# Patient Record
Sex: MALE | Race: WHITE | NOT HISPANIC OR LATINO | Employment: FULL TIME | URBAN - METROPOLITAN AREA
[De-identification: names, ages, dates, MRNs, and addresses within clinical notes are randomized per-mention and may not be internally consistent; named-entity substitution may affect disease eponyms.]

---

## 2018-04-26 PROCEDURE — 88304 TISSUE EXAM BY PATHOLOGIST: CPT | Performed by: PATHOLOGY

## 2018-04-26 PROCEDURE — 88342 IMHCHEM/IMCYTCHM 1ST ANTB: CPT | Performed by: PATHOLOGY

## 2018-04-27 ENCOUNTER — LAB REQUISITION (OUTPATIENT)
Dept: LAB | Facility: HOSPITAL | Age: 53
End: 2018-04-27
Payer: COMMERCIAL

## 2018-04-27 DIAGNOSIS — K14.8 OTHER DISEASES OF TONGUE: ICD-10-CM

## 2022-07-06 ENCOUNTER — TELEPHONE (OUTPATIENT)
Dept: UROLOGY | Facility: MEDICAL CENTER | Age: 57
End: 2022-07-06

## 2022-07-06 NOTE — TELEPHONE ENCOUNTER
Spoke with patient's wife  Scheduled appointment with Dr Katja Velazco  Requested that all records be faxed as well as the disc of the MRI of the prostate from 4/8/21 from [facility]  She is going to have everything sent over before the appt

## 2022-07-06 NOTE — TELEPHONE ENCOUNTER
Please Triage  New Patient    What is the reason for the patients appointment? Pt's wife called the office stated that Soniya Herrera was diagnosed with prostate cancer last year and he would like to follow up with a urologies  What office location does the patient prefer? [place]/ [place]     Imaging/Lab Results: care everywhere     Do we accept the patient's insurance or is the patient Self-Pay? Insurance Provider: [employer]  Plan Type/Number:  Member ID#: Has the patient had any previous Urologist(s)? [facility] and [facility]   Pt will call them to fax us all the records     Have patient records been requested? If not are records showing in Epic: care everywhere    Has the patient had any outside testing done?  Care everywhere    Does the patient have a personal history of cancer?   prostate     Pt can be reached at [phone]

## 2022-07-14 ENCOUNTER — TELEPHONE (OUTPATIENT)
Dept: UROLOGY | Facility: MEDICAL CENTER | Age: 57
End: 2022-07-14

## 2022-07-19 ENCOUNTER — OFFICE VISIT (OUTPATIENT)
Dept: UROLOGY | Facility: AMBULATORY SURGERY CENTER | Age: 57
End: 2022-07-19
Payer: COMMERCIAL

## 2022-07-19 VITALS
OXYGEN SATURATION: 98 % | DIASTOLIC BLOOD PRESSURE: 80 MMHG | WEIGHT: 193 LBS | SYSTOLIC BLOOD PRESSURE: 122 MMHG | HEART RATE: 54 BPM

## 2022-07-19 DIAGNOSIS — C61 PROSTATE CANCER (HCC): Primary | ICD-10-CM

## 2022-07-19 PROCEDURE — 99205 OFFICE O/P NEW HI 60 MIN: CPT | Performed by: UROLOGY

## 2022-07-19 RX ORDER — ASPIRIN 81 MG/1
81 TABLET, CHEWABLE ORAL DAILY
COMMUNITY

## 2022-07-19 NOTE — PROGRESS NOTES
7/19/2022    William Colin  1965  [id number]        Assessment  [place] 6 prostate cancer    Plan  We had an extended prostate cancer discussion today  Although he was diagnosed with Emilie 6 prostate cancer over 1 year ago, he never really had a full explanation of the implications of this finding and options  We discussed the grading and staging of prostate cancer in detail  I explained both the Emilie grading scale and a new work grade grouping scale  Explained that he is on the lowest level of both scales  As he only has 1 core positive, he is categorized as very low risk disease  Options were reviewed  Standard of care is active surveillance  He had many questions about possible surgeries as well as potential radiation therapy  He reported that his father underwent radical prostatectomy many years ago and has incontinence  His father long underwent what sounds like TURP and he asked that this was an option as well  We discussed that really recommendation for him is active surveillance  We discussed periodic PSA, follow-up MRI at 1 year, and typically follow-up biopsy as well  He has been over 1 year and has not yet had a PSA, MRI or biopsy  At this point, my recommendation is follow-up PSA as well as MRI  If there is any cause for concern, would then proceed with biopsy as appropriate either MRI fusion or random  However if PSA remains stable and MRI revealed no risk, he may wish to simply continue to monitor without confirmatory biopsy by utilizing both PSA and MRI in the future  They are happy with this plan  He will also watch for any urinary symptoms  If he notices increased symptoms he understands cystoscopy will be recommended, especially in light of his history of stricture  History of Present Illness  Dipti Ward is a 55 y o  male managed by [facility]  He is found to have elevated PSA of 7 in April 2021    An MRI was performed revealing PI-RADS 1 category, 43 mL prostate volume  He underwent random prostate biopsy which revealed 1/12 cores positive for Emilie 6 prostate cancer  He was diagnosed with very low risk, low-grade prostate cancer  He began surveillance  He was recommended to follow up for another biopsy 6 months later but he chose not to do so  He was then seen just recently and biopsy was recommended  He is now seeking second opinion  He is concerned with potential treatment options for prostate cancer  He reports he never had a prostate cancer discussion after his initial biopsy  He was called by a nurse who revealed results on the phone while he was at work and arranged his follow-up  He is here with his wife, both from have questions regarding his diagnosis, staging, and appropriate treatment course  He does have family history of prostate cancer in his father who underwent radical prostatectomy many years ago  He is not sure if it was robotic  He does wear pads daily due to incontinence  Patient also gives history of urethral stricture  He was seen by numerous urologists in the past for this  Eventually he was referred to [facility] in [place] and underwent formal open urethroplasty  Review of Systems  Review of Systems   Constitutional: Negative  HENT: Negative  Respiratory: Negative  Cardiovascular: Negative  Gastrointestinal: Negative  Genitourinary:        As per HPI   Musculoskeletal: Negative  Skin: Negative  Neurological: Negative  Hematological: Negative            Past Medical History  Past Medical History:   Diagnosis Date    Inguinal hernia 06/22/2022    left side    Inguinal hernia 2008    right side    Kidney stone 2014       Past Social History  Past Surgical History:   Procedure Laterality Date    HERNIA REPAIR Left 06/2022    HERNIA REPAIR Right 2008    URETHRAL DILATION N/A 1990       Past Family History  Family History   Problem Relation Age of Onset    Heart Valve Disease Father [de-identified]    Prostate cancer Father 76    Hyperlipidemia Mother     Heart attack Paternal Grandfather        Past Social history  Social History     Socioeconomic History    Marital status: /Civil Union     Spouse name: Not on file    Number of children: Not on file    Years of education: Not on file    Highest education level: Not on file   Occupational History    Not on file   Tobacco Use    Smoking status: Never Smoker    Smokeless tobacco: Never Used   Vaping Use    Vaping Use: Never used   Substance and Sexual Activity    Alcohol use: Not Currently    Drug use: Never    Sexual activity: Yes     Partners: Female   Other Topics Concern    Not on file   Social History Narrative    Not on file     Social Determinants of Health     Financial Resource Strain: Not on file   Food Insecurity: Not on file   Transportation Needs: Not on file   Physical Activity: Not on file   Stress: Not on file   Social Connections: Not on file   Intimate Partner Violence: Not on file   Housing Stability: Not on file     Social History     Tobacco Use   Smoking Status Never Smoker   Smokeless Tobacco Never Used       Current Medications  Current Outpatient Medications   Medication Sig Dispense Refill    aspirin 81 mg chewable tablet Chew 81 mg daily       No current facility-administered medications for this visit  Allergies  Allergies   Allergen Reactions    Sulfa Antibiotics Hives     Other reaction(s): Hives       Past Medical History, Social History, Family History, medications and allergies were reviewed  Vitals  Vitals:    07/19/22 1537   BP: 122/80   BP Location: Left arm   Patient Position: Sitting   Cuff Size: Adult   Pulse: (!) 54   SpO2: 98%   Weight: 87 5 kg (193 lb)       Physical Exam  Physical Exam  Vitals reviewed  Constitutional:       Appearance: He is well-developed  HENT:      Head: Normocephalic and atraumatic     Eyes:      Conjunctiva/sclera: Conjunctivae normal  Cardiovascular:      Rate and Rhythm: Normal rate  Pulmonary:      Effort: Pulmonary effort is normal    Abdominal:      Palpations: Abdomen is soft  Genitourinary:     Comments: Patient deferred prostate examination today  Musculoskeletal:         General: Normal range of motion  Skin:     General: Skin is warm and dry  Neurological:      Mental Status: He is alert and oriented to person, place, and time     Psychiatric:         Mood and Affect: Mood normal             Results  No results found for: PSA  No results found for: GLUCOSE, CALCIUM, NA, K, CO2, CL, BUN, CREATININE  No results found for: WBC, HGB, HCT, MCV, PLT

## 2022-09-01 ENCOUNTER — HOSPITAL ENCOUNTER (OUTPATIENT)
Dept: RADIOLOGY | Age: 57
Discharge: HOME | End: 2022-09-01
Payer: COMMERCIAL

## 2022-09-01 DIAGNOSIS — C61 PROSTATE CANCER (HCC): ICD-10-CM

## 2022-09-01 PROCEDURE — 76377 3D RENDER W/INTRP POSTPROCES: CPT

## 2022-09-01 PROCEDURE — G1004 CDSM NDSC: HCPCS

## 2022-09-01 PROCEDURE — A9585 GADOBUTROL INJECTION: HCPCS | Performed by: UROLOGY

## 2022-09-01 PROCEDURE — 72197 MRI PELVIS W/O & W/DYE: CPT

## 2022-09-01 RX ADMIN — GADOBUTROL 9 ML: 604.72 INJECTION INTRAVENOUS at 09:22

## 2022-09-07 ENCOUNTER — TELEPHONE (OUTPATIENT)
Dept: UROLOGY | Facility: AMBULATORY SURGERY CENTER | Age: 57
End: 2022-09-07

## 2022-09-07 NOTE — TELEPHONE ENCOUNTER
Radiology Test Results - Mac Martinez from Radiology Calling with report update on MRI prostate    Pt under care of:  Dr Rubia Alas in [place]    Significant Findings - Please review

## 2022-09-08 NOTE — TELEPHONE ENCOUNTER
Called patient to discuss results of recent multiparametric MRI prostate which revealed PIRADS-4  Based on findings, would recommend proceeding with MRI fusion prostate biopsy  Patient verbalizes understanding, and wishes to proceed and continue following with Dr Kelly Infante   He had previously been following with [facility] urology, but would like to continue care at [facility]  Discussed biopsy, and informed consent provided  Advised patient that surgery coordinator will be in contact to arrange  All questions answered, patient appreciative of call

## 2022-09-13 NOTE — TELEPHONE ENCOUNTER
I spoke to the patient to schedule, patient stated he is in a meeting and will call back   Holding 10/13/2022 at [facility] with Dr Crystal Mccray

## 2022-09-14 ENCOUNTER — APPOINTMENT (OUTPATIENT)
Dept: LAB | Age: 57
End: 2022-09-14
Payer: COMMERCIAL

## 2022-09-14 DIAGNOSIS — C61 PROSTATE CANCER (HCC): ICD-10-CM

## 2022-09-14 LAB — PSA SERPL-MCNC: 5.6 NG/ML (ref 0–4)

## 2022-09-14 PROCEDURE — 84153 ASSAY OF PSA TOTAL: CPT

## 2022-09-20 NOTE — TELEPHONE ENCOUNTER
I spoke to the patient and scheduled his procedure for 10/13/2022 at [facility] with Dr Juan Francisco Trejo     -instructions given verbally and mailed  -patient aware to be NPO, needs a  and use an enema 1 hour prior to leaving the house morning of procedure  -patient aware to avoid any potentially blood thinning medications 7 days prior  -CBC, CMP, Urine C&S  2 weeks prior  -[facility] - on auth tracker 9/20/2022

## 2022-09-27 ENCOUNTER — TELEPHONE (OUTPATIENT)
Dept: UROLOGY | Facility: MEDICAL CENTER | Age: 57
End: 2022-09-27

## 2022-10-05 ENCOUNTER — APPOINTMENT (OUTPATIENT)
Dept: LAB | Age: 57
End: 2022-10-05
Payer: COMMERCIAL

## 2022-10-05 DIAGNOSIS — R97.20 ELEVATED PROSTATE SPECIFIC ANTIGEN (PSA): ICD-10-CM

## 2022-10-05 LAB
ALBUMIN SERPL BCP-MCNC: 3.4 G/DL (ref 3.5–5)
ALP SERPL-CCNC: 90 U/L (ref 46–116)
ALT SERPL W P-5'-P-CCNC: 21 U/L (ref 12–78)
ANION GAP SERPL CALCULATED.3IONS-SCNC: 4 MMOL/L (ref 4–13)
AST SERPL W P-5'-P-CCNC: 12 U/L (ref 5–45)
BASOPHILS # BLD AUTO: 0.02 THOUSANDS/ΜL (ref 0–0.1)
BASOPHILS NFR BLD AUTO: 0 % (ref 0–1)
BILIRUB SERPL-MCNC: 0.82 MG/DL (ref 0.2–1)
BUN SERPL-MCNC: 22 MG/DL (ref 5–25)
CALCIUM ALBUM COR SERPL-MCNC: 9.5 MG/DL (ref 8.3–10.1)
CALCIUM SERPL-MCNC: 9 MG/DL (ref 8.3–10.1)
CHLORIDE SERPL-SCNC: 106 MMOL/L (ref 96–108)
CO2 SERPL-SCNC: 26 MMOL/L (ref 21–32)
CREAT SERPL-MCNC: 0.94 MG/DL (ref 0.6–1.3)
EOSINOPHIL # BLD AUTO: 0.11 THOUSAND/ΜL (ref 0–0.61)
EOSINOPHIL NFR BLD AUTO: 2 % (ref 0–6)
ERYTHROCYTE [DISTWIDTH] IN BLOOD BY AUTOMATED COUNT: 13.7 % (ref 11.6–15.1)
GFR SERPL CREATININE-BSD FRML MDRD: 90 ML/MIN/1.73SQ M
GLUCOSE P FAST SERPL-MCNC: 95 MG/DL (ref 65–99)
HCT VFR BLD AUTO: 43.8 % (ref 36.5–49.3)
HGB BLD-MCNC: 14.2 G/DL (ref 12–17)
IMM GRANULOCYTES # BLD AUTO: 0.01 THOUSAND/UL (ref 0–0.2)
IMM GRANULOCYTES NFR BLD AUTO: 0 % (ref 0–2)
LYMPHOCYTES # BLD AUTO: 1.65 THOUSANDS/ΜL (ref 0.6–4.47)
LYMPHOCYTES NFR BLD AUTO: 31 % (ref 14–44)
MCH RBC QN AUTO: 27.4 PG (ref 26.8–34.3)
MCHC RBC AUTO-ENTMCNC: 32.4 G/DL (ref 31.4–37.4)
MCV RBC AUTO: 84 FL (ref 82–98)
MONOCYTES # BLD AUTO: 0.3 THOUSAND/ΜL (ref 0.17–1.22)
MONOCYTES NFR BLD AUTO: 6 % (ref 4–12)
NEUTROPHILS # BLD AUTO: 3.24 THOUSANDS/ΜL (ref 1.85–7.62)
NEUTS SEG NFR BLD AUTO: 61 % (ref 43–75)
NRBC BLD AUTO-RTO: 0 /100 WBCS
PLATELET # BLD AUTO: 266 THOUSANDS/UL (ref 149–390)
PMV BLD AUTO: 9.8 FL (ref 8.9–12.7)
POTASSIUM SERPL-SCNC: 4.1 MMOL/L (ref 3.5–5.3)
PROT SERPL-MCNC: 7.8 G/DL (ref 6.4–8.4)
RBC # BLD AUTO: 5.19 MILLION/UL (ref 3.88–5.62)
SODIUM SERPL-SCNC: 136 MMOL/L (ref 135–147)
WBC # BLD AUTO: 5.33 THOUSAND/UL (ref 4.31–10.16)

## 2022-10-05 PROCEDURE — 36415 COLL VENOUS BLD VENIPUNCTURE: CPT

## 2022-10-05 PROCEDURE — 80053 COMPREHEN METABOLIC PANEL: CPT

## 2022-10-05 PROCEDURE — 85025 COMPLETE CBC W/AUTO DIFF WBC: CPT

## 2022-10-05 PROCEDURE — 87086 URINE CULTURE/COLONY COUNT: CPT

## 2022-10-06 ENCOUNTER — TELEPHONE (OUTPATIENT)
Dept: UROLOGY | Facility: CLINIC | Age: 57
End: 2022-10-06

## 2022-10-06 LAB — BACTERIA UR CULT: NORMAL

## 2022-10-06 NOTE — TELEPHONE ENCOUNTER
Called and spoke to pt about the prep for patients upcoming procedure  I am calling in regards to your prep instructions for your appointment at the [facility]  on 10/13/22 with Dr Chapincito Diaz  under IV Se  We need you to please make sure to stop all blood thinners (including multivitamins) ( Eliquis or Warfarin should be clarified with Cardiology before stopping) 7 day prior to you appointment  Pt should have nothing to eat after midnight the day before the procedure  The pt will need to make sure they have a   Finally the Pt will need to use an enema at least 2 hour prior to the appointment  If you have any questions please call [phone] and ask for Fannie Garcia "    Patients expressed understanding of instructions and has no further questions at this time

## 2022-10-06 NOTE — PRE-PROCEDURE INSTRUCTIONS
Pre-Surgery Instructions:   Medication Instructions   • aspirin 81 mg chewable tablet Stop taking 7 days prior to surgery  NPO after midnight, meds in am with sips of water  Understands when to expect preop phone call  Remove all jewelry  Advised of visitation policy  Understands when to use enema    Before your operation, you play an important role in decreasing your risk for infection by washing with special antiseptic soap  This is an effective way to reduce bacteria on the skin which may help to prevent infections at the surgical site  Please read the following directions in advance  · Use antibacterial soap  2  The day before your operation follow these directions carefully to get ready  · Place clean lines (sheets) on your bed; you should sleep on clean sheets after your evening shower  · Get clean towels and washcloths ready - you need enough for 2 showers  · Set aside clean underwear, pajamas, and clothing to wear after the shower  Reminders:  · DO NOT use any other soap or body rinse on your skin during or after the antiseptic showers  · DO NOT use lotion , powder, deodorant, or perfume/aftershave of any kind on your skin after your antiseptic shower  · DO NOT shave any body parts in the 24 hours/the day before your operation  · DO NOT get the antiseptic soap in your eyes, ears, nose, mouth, or vaginal area  3      You will need to shower the night before AND the morning of your Surgery  Shower 1:  · The evening before your operation, take the fist shower  · First, shampoo your hair with regular shampoo and rinse it completely before you use the anitseptic soap  After washing and rinsing your hair, rinse your body  · Next, use a clean wash cloth to apply the antiseptic soap and wash your body from the neck down to your toes using 1/2 bottle of the antiseptic soap  You will use the other 1/2 bottle for the second shower    · Clean the area where your incision will be; later this area well for about 2 minutes  · If you ar having head or neck surgery, wash areas with the antiseptic soap  · Rinse yourself completely with running water  · Use a clean towel to dry off  · Wear clean underwear and clothing/pajamas  Shower 2:  · The Morning of your operation, take the second shower following the same steps as Shower 1 using the second 1/2 of the bottle of antiseptic soap  · Use clean cloths and towels to was and dry yourself off  · Wear clean underwear and clothing

## 2022-10-12 ENCOUNTER — ANESTHESIA EVENT (OUTPATIENT)
Dept: PERIOP | Facility: HOSPITAL | Age: 57
End: 2022-10-12
Payer: COMMERCIAL

## 2022-10-13 ENCOUNTER — ANESTHESIA (OUTPATIENT)
Dept: PERIOP | Facility: HOSPITAL | Age: 57
End: 2022-10-13
Payer: COMMERCIAL

## 2022-10-13 ENCOUNTER — TELEPHONE (OUTPATIENT)
Dept: UROLOGY | Facility: AMBULATORY SURGERY CENTER | Age: 57
End: 2022-10-13

## 2022-10-13 ENCOUNTER — HOSPITAL ENCOUNTER (OUTPATIENT)
Facility: HOSPITAL | Age: 57
Setting detail: OUTPATIENT SURGERY
Discharge: HOME | End: 2022-10-13
Attending: UROLOGY | Admitting: UROLOGY
Payer: COMMERCIAL

## 2022-10-13 VITALS
SYSTOLIC BLOOD PRESSURE: 146 MMHG | RESPIRATION RATE: 12 BRPM | HEIGHT: 70 IN | TEMPERATURE: 97 F | HEART RATE: 49 BPM | BODY MASS INDEX: 27.81 KG/M2 | WEIGHT: 194.22 LBS | DIASTOLIC BLOOD PRESSURE: 86 MMHG | OXYGEN SATURATION: 99 %

## 2022-10-13 DIAGNOSIS — R97.20 ELEVATED PROSTATE SPECIFIC ANTIGEN (PSA): ICD-10-CM

## 2022-10-13 PROCEDURE — NC001 PR NO CHARGE: Performed by: UROLOGY

## 2022-10-13 PROCEDURE — 88344 IMHCHEM/IMCYTCHM EA MLT ANTB: CPT | Performed by: STUDENT IN AN ORGANIZED HEALTH CARE EDUCATION/TRAINING PROGRAM

## 2022-10-13 PROCEDURE — G0416 PROSTATE BIOPSY, ANY MTHD: HCPCS | Performed by: STUDENT IN AN ORGANIZED HEALTH CARE EDUCATION/TRAINING PROGRAM

## 2022-10-13 PROCEDURE — 55700 PR BIOPSY OF PROSTATE,NEEDLE/PUNCH: CPT | Performed by: UROLOGY

## 2022-10-13 PROCEDURE — 76942 ECHO GUIDE FOR BIOPSY: CPT | Performed by: UROLOGY

## 2022-10-13 RX ORDER — ONDANSETRON 2 MG/ML
4 INJECTION INTRAMUSCULAR; INTRAVENOUS ONCE AS NEEDED
Status: DISCONTINUED | OUTPATIENT
Start: 2022-10-13 | End: 2022-10-13 | Stop reason: HOSPADM

## 2022-10-13 RX ORDER — HYDROMORPHONE HCL/PF 1 MG/ML
0.5 SYRINGE (ML) INJECTION
Status: DISCONTINUED | OUTPATIENT
Start: 2022-10-13 | End: 2022-10-13 | Stop reason: HOSPADM

## 2022-10-13 RX ORDER — ONDANSETRON 2 MG/ML
INJECTION INTRAMUSCULAR; INTRAVENOUS AS NEEDED
Status: DISCONTINUED | OUTPATIENT
Start: 2022-10-13 | End: 2022-10-13

## 2022-10-13 RX ORDER — PROPOFOL 10 MG/ML
INJECTION, EMULSION INTRAVENOUS AS NEEDED
Status: DISCONTINUED | OUTPATIENT
Start: 2022-10-13 | End: 2022-10-13

## 2022-10-13 RX ORDER — ACETAMINOPHEN 325 MG/1
650 TABLET ORAL EVERY 6 HOURS PRN
Status: DISCONTINUED | OUTPATIENT
Start: 2022-10-13 | End: 2022-10-13 | Stop reason: HOSPADM

## 2022-10-13 RX ORDER — MIDAZOLAM HYDROCHLORIDE 2 MG/2ML
INJECTION, SOLUTION INTRAMUSCULAR; INTRAVENOUS AS NEEDED
Status: DISCONTINUED | OUTPATIENT
Start: 2022-10-13 | End: 2022-10-13

## 2022-10-13 RX ORDER — LIDOCAINE HYDROCHLORIDE 20 MG/ML
INJECTION, SOLUTION INFILTRATION; PERINEURAL AS NEEDED
Status: DISCONTINUED | OUTPATIENT
Start: 2022-10-13 | End: 2022-10-13 | Stop reason: HOSPADM

## 2022-10-13 RX ORDER — LIDOCAINE HYDROCHLORIDE 10 MG/ML
INJECTION, SOLUTION EPIDURAL; INFILTRATION; INTRACAUDAL; PERINEURAL AS NEEDED
Status: DISCONTINUED | OUTPATIENT
Start: 2022-10-13 | End: 2022-10-13

## 2022-10-13 RX ORDER — FENTANYL CITRATE 50 UG/ML
INJECTION, SOLUTION INTRAMUSCULAR; INTRAVENOUS AS NEEDED
Status: DISCONTINUED | OUTPATIENT
Start: 2022-10-13 | End: 2022-10-13

## 2022-10-13 RX ORDER — SODIUM CHLORIDE 9 MG/ML
125 INJECTION, SOLUTION INTRAVENOUS CONTINUOUS
Status: DISCONTINUED | OUTPATIENT
Start: 2022-10-13 | End: 2022-10-13 | Stop reason: HOSPADM

## 2022-10-13 RX ORDER — MEPERIDINE HYDROCHLORIDE 25 MG/ML
12.5 INJECTION INTRAMUSCULAR; INTRAVENOUS; SUBCUTANEOUS ONCE AS NEEDED
Status: DISCONTINUED | OUTPATIENT
Start: 2022-10-13 | End: 2022-10-13 | Stop reason: HOSPADM

## 2022-10-13 RX ORDER — CEFAZOLIN SODIUM 2 G/50ML
2000 SOLUTION INTRAVENOUS ONCE
Status: COMPLETED | OUTPATIENT
Start: 2022-10-13 | End: 2022-10-13

## 2022-10-13 RX ORDER — FENTANYL CITRATE/PF 50 MCG/ML
50 SYRINGE (ML) INJECTION
Status: DISCONTINUED | OUTPATIENT
Start: 2022-10-13 | End: 2022-10-13 | Stop reason: HOSPADM

## 2022-10-13 RX ORDER — IBUPROFEN 600 MG/1
600 TABLET ORAL EVERY 6 HOURS PRN
Status: DISCONTINUED | OUTPATIENT
Start: 2022-10-13 | End: 2022-10-13 | Stop reason: HOSPADM

## 2022-10-13 RX ORDER — PROMETHAZINE HYDROCHLORIDE 25 MG/ML
6.25 INJECTION, SOLUTION INTRAMUSCULAR; INTRAVENOUS ONCE AS NEEDED
Status: DISCONTINUED | OUTPATIENT
Start: 2022-10-13 | End: 2022-10-13 | Stop reason: HOSPADM

## 2022-10-13 RX ADMIN — CEFAZOLIN SODIUM 2000 MG: 2 SOLUTION INTRAVENOUS at 11:15

## 2022-10-13 RX ADMIN — LIDOCAINE HYDROCHLORIDE 80 MG: 10 INJECTION, SOLUTION EPIDURAL; INFILTRATION; INTRACAUDAL; PERINEURAL at 11:11

## 2022-10-13 RX ADMIN — ONDANSETRON 4 MG: 2 INJECTION INTRAMUSCULAR; INTRAVENOUS at 11:28

## 2022-10-13 RX ADMIN — PROPOFOL 200 MG: 10 INJECTION, EMULSION INTRAVENOUS at 11:09

## 2022-10-13 RX ADMIN — FENTANYL CITRATE 25 MCG: 50 INJECTION INTRAMUSCULAR; INTRAVENOUS at 11:28

## 2022-10-13 RX ADMIN — MIDAZOLAM 2 MG: 1 INJECTION INTRAMUSCULAR; INTRAVENOUS at 11:09

## 2022-10-13 RX ADMIN — FENTANYL CITRATE 50 MCG: 50 INJECTION INTRAMUSCULAR; INTRAVENOUS at 11:14

## 2022-10-13 RX ADMIN — FENTANYL CITRATE 25 MCG: 50 INJECTION INTRAMUSCULAR; INTRAVENOUS at 11:24

## 2022-10-13 RX ADMIN — SODIUM CHLORIDE 125 ML/HR: 0.9 INJECTION, SOLUTION INTRAVENOUS at 09:16

## 2022-10-13 NOTE — ANESTHESIA POSTPROCEDURE EVALUATION
Post-Op Assessment Note    CV Status:  Stable    Pain management: adequate     Mental Status:  Alert and awake   Hydration Status:  Euvolemic   PONV Controlled:  Controlled   Airway Patency:  Patent      Post Op Vitals Reviewed: Yes      Staff: Anesthesiologist         No complications documented      BP      Temp     Pulse    Resp      SpO2      /86   Pulse (!) 49   Temp (!) 97 °F (36 1 °C) (Temporal)   Resp 12   Ht 5' 10" (1 778 m)   Wt 88 1 kg (194 lb 3 6 oz)   SpO2 99%   BMI 27 87 kg/m²

## 2022-10-13 NOTE — ANESTHESIA PREPROCEDURE EVALUATION
Procedure:  TRANSPERINEAL MRI FUSION BIOPSY PROSTATE (N/A Perineum)    Relevant Problems   Other   (+) PSA elevation        Physical Exam    Airway    Mallampati score: II  TM Distance: >3 FB  Neck ROM: full     Dental   No notable dental hx     Cardiovascular  Rhythm: regular, Rate: normal, Cardiovascular exam normal    Pulmonary  Pulmonary exam normal Breath sounds clear to auscultation,     Other Findings        Anesthesia Plan  ASA Score- 1     Anesthesia Type- IV sedation with anesthesia with ASA Monitors  Additional Monitors:   Airway Plan:     Comment: GA prn  Plan Factors-    Chart reviewed  Patient summary reviewed  Patient is not a current smoker  Patient not instructed to abstain from smoking on day of procedure  Patient did not smoke on day of surgery  Induction- intravenous  Postoperative Plan-     Informed Consent- Anesthetic plan and risks discussed with patient and spouse

## 2022-10-13 NOTE — OP NOTE
OPERATIVE REPORT  PATIENT NAME: Sophie Fisher    :  1965  MRN: [id number]  Pt Location: [facility] OR ROOM 05    SURGERY DATE: 10/13/2022    Surgeon(s) and Role:     Priya Valderrama MD - Primary    Preop Diagnosis:  Elevated prostate specific antigen (PSA) [R97 20]    Post-Op Diagnosis Codes:     * Elevated prostate specific antigen (PSA) [R97 20]    Procedure(s) (LRB):  TRANSPERINEAL MRI FUSION BIOPSY PROSTATE (N/A)   SATURATION 26 BIOPSIES    Specimen(s):  ID Type Source Tests Collected by Time Destination   1 : RIGHT ANTERIOR MEDIAL Tissue Prostate TISSUE EXAM Ariel Mota MD 10/13/2022 1059    2 : RIGHT ANTERIOR LATERAL Tissue Prostate TISSUE EXAM Ariel Mota MD 10/13/2022 1059    3 : RIGHT POSTERIOR LATERAL Tissue Prostate TISSUE EXAM Ariel Mota MD 10/13/2022 1059    4 : LEFT ANTERIOR MEDIAL Tissue Prostate TISSUE EXAM Ariel Mota MD 10/13/2022 1059    5 : LEFT ANTERIOR LATERAL Tissue Prostate TISSUE EXAM Ariel Mota MD 10/13/2022 1059    6 : LEFT POSTERIOR LATERAL Tissue Prostate TISSUE EXAM Ariel Mota MD 10/13/2022 1059    7 : LEFT POSTERIOR MEDIAL Tissue Prostate TISSUE EXAM Ariel Mota MD 10/13/2022 1059    8 : LEFT BASE Tissue Prostate TISSUE EXAM Ariel Mota MD 10/13/2022 1059    9 : RIGHT POSTERIOR MEDIAL Tissue Prostate TISSUE EXAM Ariel Mota MD 10/13/2022 1059    10 : RIGHT BASE Tissue Prostate TISSUE EXAM Ariel Mota MD 10/13/2022 1059    11 : REGION OF INTEREST Tissue Prostate TISSUE EXAM Ariel Mota MD 10/13/2022 1100        Estimated Blood Loss:   1 mL    Drains:  * No LDAs found *    Anesthesia Type:   IV Sedation with Anesthesia    Operative Indications:  Elevated prostate specific antigen (PSA) [R97 20]      Operative Findings:  Successful MRI fusion biopsy    Complications:   None    Procedure and Technique:  Procedure was saturation transperineal biopsy utilizing the Precision Point perineal access device     55 y o  male with history of low grade prostate cancer  He presents after multiparametric MRI was obtained of the prostate  There were lesions concerning so the patient was scheduled for transperineal fusion biopsy  He was pretreated with antibiotics  He was met in the prep and hold area and the procedure along with its risks and benefits were discussed and reviewed  Patient signed an informed consent  Transferred to OR  He was placed in dorsal lithotomy with care to pad all pressure points  His perineum and genitalia were prepped with a ChloraPrep solution  Sterile Iodoband was placed over the perineum above the rectum  Side fire biplanar transrectal ultrasound was performed and measurements of the prostate gland were taken as above  Biplanar transrectal ultrasound was placed in the patient's rectum  With the assistance of the technician, the prior obtained MRI images which had been form added for the abnormal lesions were mapped to the real-time ultrasound  In the midportion of the left and right prostate, a sanket was denoted in the perineal skin  Skin wheal was elevated with 5 cc of 1% lidocaine plain  0 5% Marcaine on either side  The Novogy device was then introduced into the left perineal subcutaneous tissue  We visualized the tip of the needle  Spinal needle was introduced through the subcutaneous fat and into the pelvic floor muscle where a perineal pudendal block was performed with additional 5 cc of 1% lidocaine  This was repeated on the patient's right side  Utilizing the [place] [employer] access device, the perineum was accessed via finder needle  Ultrasound guidance was utilized to place the needle into the lesion that was mass at the right apex (ROI1)  Six specimens taken  We confirmed good position of the needle strikes utilizing the fusion software         On the right side of the prostate, we performed serial biopsies of the right posterior medial peripheral zone, right posterior lateral peripheral zone and moving up the anterior horn to the right anterior lateral and right anteromedial zone  Separate specimen was taken from the right-sided prostate base  Two cores taken from each  The PrecisionPoint device was repositioned into the left perineal stab  The biopsies were undertaken in the same fashion on the left side  Left posterior medial, left posterior lateral, left anterior lateral, left anteromedial and left base, 2 cores each  The transrectal ultrasound probe was removed  The Iodoband was retracted  Some gentle pressure was placed on the perineum for approximately 5 minutes  Direct pressure was held for 5 minutes for hemostasis  At the completion of the procedure, the patient was taken out of dorsal lithotomy, extubated and transferred to PACU in good condition  Overall the patient tolerated the procedure and there were no complications  Plan-the patient will return for biopsy pathology review in 2 weeks       I was present for the entire procedure    Patient Disposition:  PACU         SIGNATURE: Long Zacarias MD  DATE: October 13, 2022  TIME: 11:37 AM

## 2022-10-13 NOTE — TELEPHONE ENCOUNTER
Post Op Note    Pham Merlos is a 55 y o  male s/p Transperineal MRI Fusion Biopsy Prostate performed 10/13/2022  Pham Merlos is a patient of Dr Kemal Carbajal and is seen at the [facility]

## 2022-10-13 NOTE — H&P
Sophie Askew  1965  [id number]           Assessment  Emilie 6 prostate cancer     Plan  We had an extended prostate cancer discussion today  Although he was diagnosed with [place] 6 prostate cancer over 1 year ago, he never really had a full explanation of the implications of this finding and options        We discussed the grading and staging of prostate cancer in detail  I explained both the Emilie grading scale and a new work grade grouping scale  Explained that he is on the lowest level of both scales  As he only has 1 core positive, he is categorized as very low risk disease      Options were reviewed  Standard of care is active surveillance  He had many questions about possible surgeries as well as potential radiation therapy  He reported that his father underwent radical prostatectomy many years ago and has incontinence  His father long underwent what sounds like TURP and he asked that this was an option as well        We discussed that really recommendation for him is active surveillance  We discussed periodic PSA, follow-up MRI at 1 year, and typically follow-up biopsy as well  He has been over 1 year and has not yet had a PSA, MRI or biopsy      At this point, my recommendation is follow-up PSA as well as MRI  If there is any cause for concern, would then proceed with biopsy as appropriate either MRI fusion or random  However if PSA remains stable and MRI revealed no risk, he may wish to simply continue to monitor without confirmatory biopsy by utilizing both PSA and MRI in the future      They are happy with this plan  He will also watch for any urinary symptoms  If he notices increased symptoms he understands cystoscopy will be recommended, especially in light of his history of stricture      Transperineal MRI fusion prostate biopsy as planned  Technique, risks, benefits reviewed  Consent obtained  Ariel Mota MD           History of Present Illness  Abhinav Stout is a 55 y  o  male managed by [facility]  He is found to have elevated PSA of 7 in April 2021  An MRI was performed revealing PI-RADS 1 category, 43 mL prostate volume  He underwent random prostate biopsy which revealed 1/12 cores positive for [place]il[place] 6 prostate cancer      He was diagnosed with very low risk, low-grade prostate cancer  He began surveillance  He was recommended to follow up for another biopsy 6 months later but he chose not to do so  He was then seen just recently and biopsy was recommended  He is now seeking second opinion      He is concerned with potential treatment options for prostate cancer  He reports he never had a prostate cancer discussion after his initial biopsy  He was called by a nurse who revealed results on the phone while he was at work and arranged his follow-up      He is here with his wife, both from have questions regarding his diagnosis, staging, and appropriate treatment course      He does have family history of prostate cancer in his father who underwent radical prostatectomy many years ago  He is not sure if it was robotic  He does wear pads daily due to incontinence      Patient also gives history of urethral stricture  He was seen by numerous urologists in the past for this  Eventually he was referred to [facility] in [place] and underwent formal open urethroplasty            Review of Systems  Review of Systems   Constitutional: Negative  HENT: Negative  Respiratory: Negative  Cardiovascular: Negative  Gastrointestinal: Negative  Genitourinary:        As per HPI   Musculoskeletal: Negative  Skin: Negative  Neurological: Negative      Hematological: Negative              Past Medical History  Medical History        Past Medical History:   Diagnosis Date   • Inguinal hernia 06/22/2022     left side   • Inguinal hernia 2008     right side   • Kidney stone 2014            Past Social History  Surgical History         Past Surgical History: Procedure Laterality Date   • HERNIA REPAIR Left 06/2022   • HERNIA REPAIR Right 2008   • URETHRAL DILATION N/A 1990            Past Family History  Family History         Family History   Problem Relation Age of Onset   • Heart Valve Disease Father [de-identified]   • Prostate cancer Father 76   • Hyperlipidemia Mother     • Heart attack Paternal Grandfather              Past Social history  Social History               Socioeconomic History   • Marital status: /Civil Union       Spouse name: Not on file   • Number of children: Not on file   • Years of education: Not on file   • Highest education level: Not on file   Occupational History   • Not on file   Tobacco Use   • Smoking status: Never Smoker   • Smokeless tobacco: Never Used   Vaping Use   • Vaping Use: Never used   Substance and Sexual Activity   • Alcohol use: Not Currently   • Drug use: Never   • Sexual activity: Yes       Partners: Female   Other Topics Concern   • Not on file   Social History Narrative   • Not on file      Social Determinants of Health      Financial Resource Strain: Not on file   Food Insecurity: Not on file   Transportation Needs: Not on file   Physical Activity: Not on file   Stress: Not on file   Social Connections: Not on file   Intimate Partner Violence: Not on file   Housing Stability: Not on file         Social History          Tobacco Use   Smoking Status Never Smoker   Smokeless Tobacco Never Used         Current Medications  Current Medications          Current Outpatient Medications   Medication Sig Dispense Refill   • aspirin 81 mg chewable tablet Chew 81 mg daily          No current facility-administered medications for this visit             Allergies        Allergies   Allergen Reactions   • Sulfa Antibiotics Hives       Other reaction(s): Hives         Past Medical History, Social History, Family History, medications and allergies were reviewed      Vitals  /82   Pulse (!) 54   Temp 98 °F (36 7 °C) (Temporal) Resp 16   Ht 5' 10" (1 778 m)   Wt 88 1 kg (194 lb 3 6 oz)   SpO2 99%   BMI 27 87 kg/m²               Physical Exam  Physical Exam  Vitals reviewed  Constitutional:       Appearance: He is well-developed  HENT:      Head: Normocephalic and atraumatic  Eyes:      Conjunctiva/sclera: Conjunctivae normal    Cardiovascular:      Rate and Rhythm: Normal rate and rhythm  Pulmonary:      Effort: Pulmonary effort is normal Clear bilaterally  Abdominal:      Palpations: Abdomen is soft  Genitourinary:     Comments: Patient deferred prostate examination today  Musculoskeletal:         General: Normal range of motion  Skin:     General: Skin is warm and dry  Neurological:      Mental Status: He is alert and oriented to person, place, and time     Psychiatric:         Mood and Affect: Mood normal

## 2022-10-14 NOTE — TELEPHONE ENCOUNTER
Post Op Note    Dow Oppenheim is a 55 y o  male s/p Transperineal MRI Fusion biopsy prostate performed 10/13/2022  Dow Oppenheim is a patient of Dr Tad Kent and is seen at the [place] office  How would you rate your pain on a scale from 1 to 10, 10 being the worst pain ever? 0  Just some discomfort  Have you had a fever? No  Have your bowel movements been regular? No  - will try some apricots  Do you have any difficulty urinating? No just some blood with  First pee  Do you have any other questions or concerns that I can address at this time?  reviewed f/u appointment 10/31 @ 4:00 at Dr Tad Kent

## 2022-10-19 PROCEDURE — G0416 PROSTATE BIOPSY, ANY MTHD: HCPCS | Performed by: STUDENT IN AN ORGANIZED HEALTH CARE EDUCATION/TRAINING PROGRAM

## 2022-10-19 PROCEDURE — 88344 IMHCHEM/IMCYTCHM EA MLT ANTB: CPT | Performed by: STUDENT IN AN ORGANIZED HEALTH CARE EDUCATION/TRAINING PROGRAM

## 2022-10-31 ENCOUNTER — OFFICE VISIT (OUTPATIENT)
Dept: UROLOGY | Facility: AMBULATORY SURGERY CENTER | Age: 57
End: 2022-10-31

## 2022-10-31 VITALS
SYSTOLIC BLOOD PRESSURE: 112 MMHG | BODY MASS INDEX: 27.77 KG/M2 | HEIGHT: 70 IN | DIASTOLIC BLOOD PRESSURE: 82 MMHG | WEIGHT: 194 LBS

## 2022-10-31 DIAGNOSIS — C61 PROSTATE CANCER (HCC): Primary | ICD-10-CM

## 2022-10-31 NOTE — PROGRESS NOTES
10/31/2022    [facility]  1965  [id number]        Assessment  [place] 6 prostate cancer, active surveillance    Plan  We reviewed that current MRI fusion biopsy reveals 3 of 26 cores positive for Emilie 6 prostate cancer  Interestingly none of the 6 biopsies from the region of interest revealed malignancy  Essentially, this is similar to his previous biopsy results 1 year ago  Patient and his wife were reassured that there is no evidence of progressive disease  In fact PSA has come down a bit  They are comfortable with this at this point and feel much relief  Discussed recommendation for active surveillance going forward  I recommend PSA every 6 months and MRI in 1 year  He may not require additional biopsy if results remained stable  Eventually likely proceed with annual PSA and eventually discontinue MRI if they remain stable for the next few years  All questions answered and they are happy with the plan  History of Present Illness  Priyanka Hu is a 55 y o  male previously managed by [facility] with PSA of 7, found to have [place] 6 prostate cancer 1 of 12 cores in April 2021  He transferred care for second opinion and further evaluation  PSA actually went down to 5 6  MRI was performed and he underwent MRI fusion biopsy based on Pi-Rads for finding  He and his wife return today to discuss results  Other than mild hematuria he has no complaints  He feels well  Review of Systems  Review of Systems   Constitutional: Negative  HENT: Negative  Respiratory: Negative  Cardiovascular: Negative  Gastrointestinal: Negative  Genitourinary:        As per HPI   Musculoskeletal: Negative  Skin: Negative  Neurological: Negative  Hematological: Negative            Past Medical History  Past Medical History:   Diagnosis Date   • COVID-19 2020   • Inguinal hernia 06/22/2022    left side   • Inguinal hernia 2008    right side   • Kidney stone 2014 Past Social History  Past Surgical History:   Procedure Laterality Date   • HERNIA REPAIR Left 06/2022   • HERNIA REPAIR Right 2008   • CA BIOPSY OF PROSTATE,NEEDLE,TRANSPERINEAL N/A 10/13/2022    Procedure: TRANSPERINEAL MRI FUSION BIOPSY PROSTATE;  Surgeon: Ebony Avalos MD;  Location: [facility] OR;  Service: Urology   • URETHRAL DILATION N/A 1990    Reconstruction       Past Family History  Family History   Problem Relation Age of Onset   • Heart Valve Disease Father [de-identified]   • Prostate cancer Father 76   • Hyperlipidemia Mother    • Heart attack Paternal Grandfather        Past Social history  Social History     Socioeconomic History   • Marital status: /Civil Union     Spouse name: Not on file   • Number of children: Not on file   • Years of education: Not on file   • Highest education level: Not on file   Occupational History   • Not on file   Tobacco Use   • Smoking status: Never Smoker   • Smokeless tobacco: Never Used   Vaping Use   • Vaping Use: Never used   Substance and Sexual Activity   • Alcohol use: Not Currently     Comment: 1-2 x month   • Drug use: Never   • Sexual activity: Yes     Partners: Female   Other Topics Concern   • Not on file   Social History Narrative   • Not on file     Social Determinants of Health     Financial Resource Strain: Not on file   Food Insecurity: Not on file   Transportation Needs: Not on file   Physical Activity: Not on file   Stress: Not on file   Social Connections: Not on file   Intimate Partner Violence: Not on file   Housing Stability: Not on file     Social History     Tobacco Use   Smoking Status Never Smoker   Smokeless Tobacco Never Used       Current Medications  Current Outpatient Medications   Medication Sig Dispense Refill   • aspirin 81 mg chewable tablet Chew 81 mg daily       No current facility-administered medications for this visit         Allergies  Allergies   Allergen Reactions   • Sulfa Antibiotics Hives     Other reaction(s): Hives Past Medical History, Social History, Family History, medications and allergies were reviewed      Vitals  Vitals:    10/31/22 1612   BP: 112/82   Weight: 88 kg (194 lb)   Height: 5' 10" (1 778 m)       Physical Exam  Physical Exam      Results  Lab Results   Component Value Date    PSA 5 6 (H) 09/14/2022     Lab Results   Component Value Date    CALCIUM 9 0 10/05/2022    K 4 1 10/05/2022    CO2 26 10/05/2022     10/05/2022    BUN 22 10/05/2022    CREATININE 0 94 10/05/2022     Lab Results   Component Value Date    WBC 5 33 10/05/2022    HGB 14 2 10/05/2022    HCT 43 8 10/05/2022    MCV 84 10/05/2022     10/05/2022

## 2024-10-10 ENCOUNTER — OFFICE VISIT (OUTPATIENT)
Dept: UROLOGY | Facility: AMBULATORY SURGERY CENTER | Age: 59
End: 2024-10-10

## 2024-10-10 VITALS
OXYGEN SATURATION: 99 % | HEIGHT: 69 IN | BODY MASS INDEX: 29.33 KG/M2 | DIASTOLIC BLOOD PRESSURE: 90 MMHG | SYSTOLIC BLOOD PRESSURE: 130 MMHG | HEART RATE: 61 BPM | WEIGHT: 198 LBS

## 2024-10-10 DIAGNOSIS — C61 PROSTATE CANCER (HCC): ICD-10-CM

## 2024-10-10 DIAGNOSIS — R31.0 GROSS HEMATURIA: Primary | ICD-10-CM

## 2024-10-10 LAB
SL AMB  POCT GLUCOSE, UA: NORMAL
SL AMB LEUKOCYTE ESTERASE,UA: NORMAL
SL AMB POCT BILIRUBIN,UA: NORMAL
SL AMB POCT BLOOD,UA: NORMAL
SL AMB POCT CLARITY,UA: CLEAR
SL AMB POCT COLOR,UA: YELLOW
SL AMB POCT KETONES,UA: NORMAL
SL AMB POCT NITRITE,UA: NORMAL
SL AMB POCT PH,UA: 5
SL AMB POCT SPECIFIC GRAVITY,UA: 1.03
SL AMB POCT URINE PROTEIN: NORMAL
SL AMB POCT UROBILINOGEN: 0.2

## 2024-10-10 RX ORDER — ATORVASTATIN CALCIUM 10 MG/1
10 TABLET, FILM COATED ORAL DAILY
COMMUNITY
Start: 2023-10-31 | End: 2024-10-30

## 2024-10-10 NOTE — PROGRESS NOTES
Assessment and plan:     Prostate cancer (HCC)  [place] 3+3 equal 6 prostate cancer, under active surveillance  Was last seen in the office on 10/31/2022 by Dr. Francois, at that time they revealed his pathology from his prostate biopsy, biopsy revealed 3 out of 26 cores positive for Emilie 6 prostate cancer  Patient is due for both PSA and multiparametric MRI of the prostate for active surveillance, will plan to call patient with results  SHARON in office today smooth with no appreciable nodules or masses  Lab Results   Component Value Date    PSA 5.6 (H) 09/14/2022        Gross hematuria  Reports of gross hematuria approximately 2 weeks ago that lasted about a week and a half, no other associated symptoms  No urine studies on file to review  CT urogram ordered, will call patient with results, BMP to be obtained prior to imaging  UA in office today unremarkable and not demonstrating any blood in the urine  Plan to follow-up for cystoscopy to complete hematuria workup    History of Present Illness     Pedro Muñiz is a 58 y.o. male who presents today to the office for follow-up of gross hematuria and [place] 3+3 equal 6 prostate cancer.  He was last seen in October 2022 by Dr. Francois.  He was diagnosed with [place]ilie 6 prostate cancer and is currently under active surveillance.    Today in the office he states that about 2 weeks ago he noticed blood in the urine that lasted for about a week to week and a half.  He denies any other associated symptoms with this.  He denies any history of kidney stone passage.  He denies any dysuria, suprapubic pain or flank pain.  He states that in 1990 he was diagnosed with a urinary stricture which was surgically repaired.  He states that he has not noticed any blood in the urine since that time.    He also has a history of [place] 6 prostate cancer and is under active surveillance.  He states that he knew he was due for follow-up and he unfortunately missed/did not schedule  "any follow-up appointments.      Laboratory     Lab Results   Component Value Date    BUN 13 10/30/2023    CREATININE 0.82 10/30/2023       No components found for: \"GFR\"    Lab Results   Component Value Date    CALCIUM 9.5 10/30/2023    K 4.3 10/30/2023    CO2 27 10/30/2023     10/30/2023       Lab Results   Component Value Date    WBC 5.33 10/05/2022    HGB 14.2 10/05/2022    HCT 43.8 10/05/2022    MCV 84 10/05/2022     10/05/2022       Lab Results   Component Value Date    PSA 5.6 (H) 09/14/2022       Recent Results (from the past 1 hour(s))   POCT urine dip    Collection Time: 10/10/24 12:42 PM   Result Value Ref Range    LEUKOCYTE ESTERASE,UA -     NITRITE,UA -     SL AMB POCT UROBILINOGEN 0.2     POCT URINE PROTEIN -      PH,UA 5.0     BLOOD,UA -     SPECIFIC GRAVITY,UA 1.030     KETONES,UA -     BILIRUBIN,UA -     GLUCOSE, UA -      COLOR,UA yellow     CLARITY,UA clear        Review of Systems     Review of Systems   Constitutional:  Negative for chills and fever.   Respiratory: Negative.  Negative for cough and shortness of breath.    Cardiovascular: Negative.  Negative for chest pain.   Gastrointestinal: Negative.  Negative for abdominal distention, abdominal pain, nausea and vomiting.   Genitourinary:  Negative for decreased urine volume, difficulty urinating, dysuria, flank pain, frequency, hematuria, penile discharge, penile pain, penile swelling, scrotal swelling, testicular pain and urgency.   Skin: Negative.  Negative for rash.   Neurological: Negative.    Hematological:  Negative for adenopathy. Does not bruise/bleed easily.                 Allergies     Allergies   Allergen Reactions    Sulfa Antibiotics Hives     Other reaction(s): Hives       Physical Exam     Physical Exam  Vitals reviewed.   Constitutional:       Appearance: Normal appearance.   HENT:      Head: Normocephalic and atraumatic.   Eyes:      Pupils: Pupils are equal, round, and reactive to light.   Cardiovascular:      " "Rate and Rhythm: Normal rate.   Pulmonary:      Effort: Pulmonary effort is normal.   Genitourinary:     Comments: SHARON in office today smooth with no appreciable nodules or masses, 40 g  Musculoskeletal:      Cervical back: Normal range of motion.   Skin:     General: Skin is warm and dry.   Neurological:      General: No focal deficit present.      Mental Status: He is alert and oriented to person, place, and time.   Psychiatric:         Mood and Affect: Mood normal.         Behavior: Behavior normal.         Thought Content: Thought content normal.         Judgment: Judgment normal.         Vital Signs     Vitals:    10/10/24 1233   BP: 130/90   BP Location: Left arm   Patient Position: Sitting   Cuff Size: Adult   Pulse: 61   SpO2: 99%   Weight: 89.8 kg (198 lb)   Height: 5' 9\" (1.753 m)       Current Medications       Current Outpatient Medications:     aspirin 81 mg chewable tablet, Chew 81 mg daily, Disp: , Rfl:     atorvastatin (LIPITOR) 10 mg tablet, Take 10 mg by mouth daily, Disp: , Rfl:     Active Problems     Patient Active Problem List   Diagnosis    PSA elevation    Gross hematuria    Prostate cancer (HCC)       Past Medical History     Past Medical History:   Diagnosis Date    COVID-19 2020    Inguinal hernia 06/22/2022    left side    Inguinal hernia 2008    right side    Kidney stone 2014       Surgical History     Past Surgical History:   Procedure Laterality Date    HERNIA REPAIR Left 06/2022    HERNIA REPAIR Right 2008    ID BX PROSTATE STRTCTC SATURATION SAMPLING IMG GID N/A 10/13/2022    Procedure: TRANSPERINEAL MRI FUSION BIOPSY PROSTATE;  Surgeon: Tyo Francois MD;  Location: [facility] OR;  Service: Urology    URETHRAL DILATION N/A 1990    Reconstruction       Family History     Family History   Problem Relation Age of Onset    Heart Valve Disease Father 80    Prostate cancer Father 74    Hyperlipidemia Mother     Heart attack Paternal Grandfather        Social History     Social History "     Social History     Tobacco Use   Smoking Status Never   Smokeless Tobacco Never       Past Surgical History:   Procedure Laterality Date    HERNIA REPAIR Left 06/2022    HERNIA REPAIR Right 2008    GA BX PROSTATE STRTCTC SATURATION SAMPLING IMG GID N/A 10/13/2022    Procedure: TRANSPERINEAL MRI FUSION BIOPSY PROSTATE;  Surgeon: Toy Francois MD;  Location: [facility] OR;  Service: Urology    URETHRAL DILATION N/A 1990    Reconstruction         The following portions of the patient's history were reviewed and updated as appropriate: allergies, current medications, past family history, past medical history, past social history, past surgical history and problem list    Please note :  Voice dictation software has been used to create this document.  There may be inadvertent transcription errors.    ANNE Ortiz

## 2024-10-10 NOTE — ASSESSMENT & PLAN NOTE
Emilie 3+3 equal 6 prostate cancer, under active surveillance  Was last seen in the office on 10/31/2022 by Dr. Francois, at that time they revealed his pathology from his prostate biopsy, biopsy revealed 3 out of 26 cores positive for [place]ilie 6 prostate cancer  Patient is due for both PSA and multiparametric MRI of the prostate for active surveillance, will plan to call patient with results  SHARON in office today smooth with no appreciable nodules or masses  Lab Results   Component Value Date    PSA 5.6 (H) 09/14/2022

## 2024-10-10 NOTE — ASSESSMENT & PLAN NOTE
Reports of gross hematuria approximately 2 weeks ago that lasted about a week and a half, no other associated symptoms  No urine studies on file to review  CT urogram ordered, will call patient with results, BMP to be obtained prior to imaging  UA in office today unremarkable and not demonstrating any blood in the urine  Plan to follow-up for cystoscopy to complete hematuria workup

## 2024-10-16 ENCOUNTER — HOSPITAL ENCOUNTER (OUTPATIENT)
Dept: RADIOLOGY | Facility: HOSPITAL | Age: 59
Discharge: HOME | End: 2024-10-16
Payer: COMMERCIAL

## 2024-10-16 DIAGNOSIS — R31.0 GROSS HEMATURIA: ICD-10-CM

## 2024-10-16 PROCEDURE — 74178 CT ABD&PLV WO CNTR FLWD CNTR: CPT

## 2024-10-16 RX ADMIN — IOHEXOL 75 ML: 350 INJECTION, SOLUTION INTRAVENOUS at 14:55

## 2024-10-23 ENCOUNTER — TELEPHONE (OUTPATIENT)
Dept: UROLOGY | Facility: AMBULATORY SURGERY CENTER | Age: 59
End: 2024-10-23

## 2024-10-23 NOTE — TELEPHONE ENCOUNTER
----- Message from ANNE Ortiz sent at 10/22/2024  4:19 PM EDT -----  Please call Pedro and let him know that I reviewed his most recent CT scan.  He does have bilateral small parapelvic cyst which are not concerning and do not require any additional imaging or follow-up.  He does have an enlarged prostate that was noted on the imaging.  Is possible this is where the bleeding could have been coming from.  There are no masses or lesions seen within his bladder.  He should plan to follow-up with a cystoscopy in the future to complete the hematuria workup.

## 2024-10-23 NOTE — TELEPHONE ENCOUNTER
Called and spoke with patient. Stated that we reviewed his most recent CT scan. He does have bilateral small parapelvic cyst which are not concerning and do not require any additional imaging or follow-up. He does have an enlarged prostate that was noted on the imaging. Is possible this is where the bleeding could have been coming from. There are no masses or lesions seen within his bladder. He should plan to follow-up with a cystoscopy in the future to complete the hematuria workup. Patient understood everything and is aware of plan.

## 2024-11-18 ENCOUNTER — APPOINTMENT (OUTPATIENT)
Dept: LAB | Age: 59
End: 2024-11-18
Payer: COMMERCIAL

## 2024-11-18 DIAGNOSIS — R31.0 GROSS HEMATURIA: ICD-10-CM

## 2024-11-18 DIAGNOSIS — C61 PROSTATE CANCER (HCC): ICD-10-CM

## 2024-11-18 LAB
ANION GAP SERPL CALCULATED.3IONS-SCNC: 7 MMOL/L (ref 4–13)
BUN SERPL-MCNC: 24 MG/DL (ref 5–25)
CALCIUM SERPL-MCNC: 9.3 MG/DL (ref 8.4–10.2)
CHLORIDE SERPL-SCNC: 104 MMOL/L (ref 96–108)
CO2 SERPL-SCNC: 27 MMOL/L (ref 21–32)
CREAT SERPL-MCNC: 0.85 MG/DL (ref 0.6–1.3)
GFR SERPL CREATININE-BSD FRML MDRD: 96 ML/MIN/1.73SQ M
GLUCOSE SERPL-MCNC: 86 MG/DL (ref 65–140)
POTASSIUM SERPL-SCNC: 4 MMOL/L (ref 3.5–5.3)
PSA SERPL-MCNC: 7.08 NG/ML (ref 0–4)
SODIUM SERPL-SCNC: 138 MMOL/L (ref 135–147)

## 2024-11-18 PROCEDURE — 80048 BASIC METABOLIC PNL TOTAL CA: CPT

## 2024-11-18 PROCEDURE — 36415 COLL VENOUS BLD VENIPUNCTURE: CPT

## 2024-11-18 PROCEDURE — 84153 ASSAY OF PSA TOTAL: CPT

## 2024-11-19 ENCOUNTER — RESULTS FOLLOW-UP (OUTPATIENT)
Dept: UROLOGY | Facility: AMBULATORY SURGERY CENTER | Age: 59
End: 2024-11-19

## 2024-11-19 NOTE — TELEPHONE ENCOUNTER
Called and left a message for this pt. Relayed Cyndi's message and supplied this pt with central scheduling phone number in case he hasn't had the MRI appointment set up yet. Also supplied the main line number if he happens to have any questions. If this pt should call back please relay these messages again. Thank you                  ----- Message from ANNE Ortiz sent at 11/19/2024  9:15 AM EST -----  PSA stable compared to other PSAs on file.  Should plan to proceed with MRI of the prostate, will call patient with those results.

## 2024-11-19 NOTE — TELEPHONE ENCOUNTER
PT wife calling because they received a letter stating that the MRI was partially denied by their insurance.  pt is scheduled today at 2:30    Please advise.  And call wife back at [phone]

## 2025-04-24 ENCOUNTER — TELEPHONE (OUTPATIENT)
Dept: UROLOGY | Facility: AMBULATORY SURGERY CENTER | Age: 60
End: 2025-04-24

## 2025-04-24 NOTE — TELEPHONE ENCOUNTER
Called pt and understands its good to have the cysto on Tuesday to keep appointment and he was having trouble getting through to CS so we will call at check out to get him scheduled.

## 2025-04-24 NOTE — TELEPHONE ENCOUNTER
Called patient to inform him there is an active MRI order in his chart to be completed preferably ptv with  on 04/29/25.Patient states he's attempted to schedule many times throughout the day but is always put on hold for too long. Patient was in a meeting when he answered but would like a call to schedule MRI;please assist.

## 2025-04-29 ENCOUNTER — PROCEDURE VISIT (OUTPATIENT)
Dept: UROLOGY | Facility: AMBULATORY SURGERY CENTER | Age: 60
End: 2025-04-29
Payer: COMMERCIAL

## 2025-04-29 VITALS
SYSTOLIC BLOOD PRESSURE: 142 MMHG | BODY MASS INDEX: 29.33 KG/M2 | WEIGHT: 198 LBS | DIASTOLIC BLOOD PRESSURE: 90 MMHG | OXYGEN SATURATION: 97 % | HEIGHT: 69 IN | HEART RATE: 58 BPM

## 2025-04-29 DIAGNOSIS — C61 PROSTATE CANCER (HCC): ICD-10-CM

## 2025-04-29 DIAGNOSIS — R31.0 GROSS HEMATURIA: Primary | ICD-10-CM

## 2025-04-29 PROCEDURE — 52000 CYSTOURETHROSCOPY: CPT | Performed by: UROLOGY

## 2025-04-29 NOTE — PROGRESS NOTES
Patient with history of [place] 6 prostate cancer.  Active surveillance suggested, has not followed through with schedule.  Has developed gross hematuria intermittently.  No pain associated with this.  He does admit to constipation and straining.    PSA 7.0 in November 2024.       Cystoscopy     Date/Time  4/29/2025 3:00 PM     Performed by  Toy Francois MD   Authorized by  Toy Francois MD         Procedure Details:  Procedure type: cystoscopy    Additional Procedure Details: Patient was prepped with chlorhexidine and lidocaine jelly.  Flexible cystoscope was placed.  Urethra is normal.  Prostate is mildly enlarged with small median lobe bulb that is obstructing.  Evaluation of the bladder reveals smooth mucosa, no suspicious mass or lesion noted.    Plan  He is due for PSA and MRI of the prostate.   Will likely need eventual intervention for BPH, but can watch for now.

## 2025-05-14 ENCOUNTER — TELEPHONE (OUTPATIENT)
Dept: UROLOGY | Facility: AMBULATORY SURGERY CENTER | Age: 60
End: 2025-05-14

## 2025-05-14 NOTE — TELEPHONE ENCOUNTER
Chart Review/Discussion with Reviewer    Ordering Provider: Cyndi RODRÍGUEZ and Dr. Toy Francois  Image Requested: Multiparametric MRI of the prostate  Image Denial reason: Needed more clinical information  Alternate Image Suggested: None    Patient Insurance Co: [employer]    P2P completed via telephone    Outcome: Approved  Approval #s: case number: [id number] valid through: 5/14/2025 -11/10/2025, approval number will be sent to the patient and insurance company will assist him in scheduling the imaging    Plan: Proceed with multiparametric MRI of the prostate

## 2025-05-29 ENCOUNTER — HOSPITAL ENCOUNTER (OUTPATIENT)
Dept: RADIOLOGY | Age: 60
Discharge: HOME | End: 2025-05-29
Payer: COMMERCIAL

## 2025-05-29 DIAGNOSIS — C61 PROSTATE CANCER (HCC): ICD-10-CM

## 2025-05-29 PROCEDURE — 72197 MRI PELVIS W/O & W/DYE: CPT

## 2025-05-29 PROCEDURE — 76377 3D RENDER W/INTRP POSTPROCES: CPT

## 2025-05-29 PROCEDURE — A9585 GADOBUTROL INJECTION: HCPCS

## 2025-05-29 RX ORDER — GADOBUTROL 604.72 MG/ML
9 INJECTION INTRAVENOUS
Status: COMPLETED | OUTPATIENT
Start: 2025-05-29 | End: 2025-05-29

## 2025-05-29 RX ADMIN — GADOBUTROL 9 ML: 604.72 INJECTION INTRAVENOUS at 15:56

## 2025-06-03 DIAGNOSIS — C61 PROSTATE CANCER (HCC): Primary | ICD-10-CM

## (undated) DEVICE — SCD SEQUENTIAL COMPRESSION COMFORT SLEEVE MEDIUM KNEE LENGTH: Brand: KENDALL SCD

## (undated) DEVICE — GLOVE SRG BIOGEL ORTHOPEDIC 7.5

## (undated) DEVICE — PREP PAD BNS: Brand: CONVERTORS

## (undated) DEVICE — TELFA NON-ADHERENT ABSORBENT DRESSING: Brand: TELFA

## (undated) DEVICE — SYRINGE 10ML LL

## (undated) DEVICE — ULTRASOUND GEL STERILE FOIL PK

## (undated) DEVICE — STERILE SURGICAL LUBRICANT,  TUBE: Brand: SURGILUBE

## (undated) DEVICE — 3M™ TRANSPORE™ WHITE SURGICAL TAPE 1534-1, 1 INCH X 10 YARD (2,5CM X 9,1M), 12 ROLLS/CARTON 10 CARTONS/CASE: Brand: 3M™ TRANSPORE™

## (undated) DEVICE — SYSTEM TRANSPERINEAL ACCESS PRECISIONPOINT

## (undated) DEVICE — SYRINGE,TOOMEY,IRRIGATION,70CC,STERILE: Brand: MEDLINE

## (undated) DEVICE — PROBE ULTRASOUND TRIPLANE TRANSRECTAL

## (undated) DEVICE — 3M™ IOBAN™ 2 ANTIMICROBIAL INCISE DRAPE 6650EZ: Brand: IOBAN™ 2

## (undated) DEVICE — MAX-CORE® DISPOSABLE CORE BIOPSY INSTRUMENT, 18G X 25CM: Brand: MAX-CORE

## (undated) DEVICE — NEEDLE 25G X 1 1/2

## (undated) DEVICE — GLOVE INDICATOR PI UNDERGLOVE SZ 7 BLUE

## (undated) DEVICE — SPONGE 4 X 4 XRAY 16 PLY STRL LF RFD

## (undated) DEVICE — RAZOR STERILE

## (undated) DEVICE — UTILITY MARKER,BLACK WITH LABELS: Brand: DEVON

## (undated) DEVICE — CHLORAPREP HI-LITE 26ML ORANGE